# Patient Record
Sex: MALE | Race: OTHER | HISPANIC OR LATINO | URBAN - METROPOLITAN AREA
[De-identification: names, ages, dates, MRNs, and addresses within clinical notes are randomized per-mention and may not be internally consistent; named-entity substitution may affect disease eponyms.]

---

## 2020-10-09 ENCOUNTER — WEB ENCOUNTER (OUTPATIENT)
Dept: URBAN - METROPOLITAN AREA CLINIC 35 | Facility: CLINIC | Age: 20
End: 2020-10-09

## 2020-10-09 ENCOUNTER — OFFICE VISIT (OUTPATIENT)
Dept: URBAN - METROPOLITAN AREA CLINIC 33 | Facility: CLINIC | Age: 20
End: 2020-10-09

## 2020-10-09 VITALS
BODY MASS INDEX: 23.84 KG/M2 | WEIGHT: 176 LBS | HEIGHT: 72 IN | DIASTOLIC BLOOD PRESSURE: 60 MMHG | SYSTOLIC BLOOD PRESSURE: 110 MMHG

## 2020-10-09 RX ORDER — PANTOPRAZOLE SODIUM 40 MG/1
1 TABLET TABLET, DELAYED RELEASE ORAL ONCE A DAY
Qty: 30 | Status: ACTIVE | COMMUNITY

## 2020-10-09 NOTE — HPI-MIGRATED HPI
;     Acid Reflux : Patient admits the continuous symptoms of GERD. Onset was (02/2020). The symptoms include/are described as burning sensation and is located epigastric region. Patient currently admits that he is not having any chest pain or any associated GERD symptoms other than sore throat. Patient admits that the sore throat has been ongoing for 9 months. Patient describes the sore throat pain as light burning sensation which is constant. Pain will exacerbate when drinking caffeine, eating spicy food, not getting enough sleep, and worsens in the morning.   Patient states that he is currently taking Protonix with some relief of symptoms. Patient denies indigestion, early satiety, changes in appetite, coughing, abdominal/epigastric pain, or changes in bowel habits.  Patient denies associated abdominal pains, bloating, and gas.  Patient admits getting some swab tests (strep, bronchitis, and COVID) and blood test with normal findings.   Patient denies family hx of gastric/esophageal cancer or diseases. Patient denies past EGD.   ;

## 2020-11-10 ENCOUNTER — OFFICE VISIT (OUTPATIENT)
Dept: URBAN - METROPOLITAN AREA SURGERY CENTER 8 | Facility: SURGERY CENTER | Age: 20
End: 2020-11-10

## 2020-11-17 ENCOUNTER — TELEPHONE ENCOUNTER (OUTPATIENT)
Dept: URBAN - METROPOLITAN AREA CLINIC 35 | Facility: CLINIC | Age: 20
End: 2020-11-17

## 2020-11-17 RX ORDER — CLARITHROMYCIN 500 MG/1
1 TABLET TABLET, FILM COATED ORAL TWICE A DAY
Qty: 28 TABLET | Refills: 0 | OUTPATIENT
Start: 2020-11-17

## 2020-11-17 RX ORDER — AMOXICILLIN 500 MG/1
2 CAPSULES CAPSULE ORAL TWICE A DAY
Qty: 56 CAPSULE | Refills: 0 | OUTPATIENT
Start: 2020-11-17

## 2020-11-17 RX ORDER — OMEPRAZOLE 40 MG/1
1 TABLET 30 MINUTES BEFORE MEAL CAPSULE, DELAYED RELEASE ORAL TWICE A DAY
Qty: 28 | Refills: 0 | OUTPATIENT
Start: 2020-11-17

## 2020-11-17 RX ORDER — LANSOPRAZOLE, AMOXICILLIN AND CLARITHROMYCIN 30-500-500
AS DIRECTED KIT ORAL AS DIRECTED
Qty: 1 KIT | Refills: 0 | OUTPATIENT
Start: 2020-11-17

## 2020-11-23 ENCOUNTER — OFFICE VISIT (OUTPATIENT)
Dept: URBAN - METROPOLITAN AREA CLINIC 33 | Facility: CLINIC | Age: 20
End: 2020-11-23

## 2020-11-23 NOTE — HPI-MIGRATED HPI
;   ;     Follow up- EGD : Patient presents today for follow up to his/her EGD which was completed on (11/10/2020) by Dr. Brett Francois.  Patient admits/denies any complications after his/her procedure. Since the procedure patient denies dysphagia, heartburn, globus, changes in appetite, and changes in bowel habits.   EGD report shows: 1. Localized mild mucosal changes characterized by erythema were found in the gastric antrum and in the gastric body. POSITIVE for H.pylori. 2. Localized mild mucosal changes characterized by erythema were found in the lower third of the esophagus.  ;   Acid Reflux :     Last visit (10/09/2020)  Patient admits the continuous symptoms of GERD. Onset was (02/2020). The symptoms include/are described as burning sensation and is located epigastric region. Patient currently admits that he is not having any chest pain or any associated GERD symptoms other than sore throat. Patient admits that the sore throat has been ongoing for 9 months. Patient describes the sore throat pain as light burning sensation which is constant. Pain will exacerbate when drinking caffeine, eating spicy food, not getting enough sleep, and worsens in the morning.   Patient states that he is currently taking Protonix with some relief of symptoms. Patient denies indigestion, early satiety, changes in appetite, coughing, abdominal/epigastric pain, or changes in bowel habits.  Patient denies associated abdominal pains, bloating, and gas.  Patient admits getting some swab tests (strep, bronchitis, and COVID) and blood test with normal findings.   Patient denies family hx of gastric/esophageal cancer or diseases. Patient denies past EGD.   ;

## 2020-12-04 ENCOUNTER — TELEPHONE ENCOUNTER (OUTPATIENT)
Dept: URBAN - METROPOLITAN AREA CLINIC 35 | Facility: CLINIC | Age: 20
End: 2020-12-04

## 2020-12-04 ENCOUNTER — OFFICE VISIT (OUTPATIENT)
Dept: URBAN - METROPOLITAN AREA CLINIC 33 | Facility: CLINIC | Age: 20
End: 2020-12-04

## 2020-12-04 VITALS
HEIGHT: 72 IN | WEIGHT: 173 LBS | SYSTOLIC BLOOD PRESSURE: 120 MMHG | DIASTOLIC BLOOD PRESSURE: 70 MMHG | BODY MASS INDEX: 23.43 KG/M2

## 2020-12-04 PROBLEM — 196731005 GASTRODUODENITIS: Status: ACTIVE | Noted: 2020-12-04

## 2020-12-04 RX ORDER — LANSOPRAZOLE, AMOXICILLIN AND CLARITHROMYCIN 30-500-500
AS DIRECTED KIT ORAL AS DIRECTED
Qty: 1 KIT | Refills: 0 | Status: ON HOLD | COMMUNITY
Start: 2020-11-17

## 2020-12-04 RX ORDER — BISMUTH SUBCITRATE POTASSIUM, METRONIDAZOLE, TETRACYCLINE HYDROCHLORIDE 140; 125; 125 MG/1; MG/1; MG/1
3 CAPSULES AFTER MEALS AND AT BEDTIME CAPSULE ORAL
Qty: 120 CAPSULE | Refills: 0 | OUTPATIENT
Start: 2020-12-04

## 2020-12-04 RX ORDER — CLARITHROMYCIN 500 MG/1
1 TABLET TABLET, FILM COATED ORAL TWICE A DAY
Qty: 28 TABLET | Refills: 0 | Status: ON HOLD | COMMUNITY
Start: 2020-11-17

## 2020-12-04 RX ORDER — AMOXICILLIN 500 MG/1
2 CAPSULES CAPSULE ORAL TWICE A DAY
Qty: 56 CAPSULE | Refills: 0 | Status: ON HOLD | COMMUNITY
Start: 2020-11-17

## 2020-12-04 RX ORDER — OMEPRAZOLE 20 MG/1
1 CAPSULE CAPSULE, DELAYED RELEASE ORAL BID
Qty: 20 CAPSULE | Refills: 0 | OUTPATIENT
Start: 2020-12-04

## 2020-12-04 RX ORDER — OMEPRAZOLE 40 MG/1
1 TABLET 30 MINUTES BEFORE MEAL CAPSULE, DELAYED RELEASE ORAL TWICE A DAY
Qty: 28 | Refills: 0 | Status: ON HOLD | COMMUNITY
Start: 2020-11-17

## 2020-12-04 RX ORDER — PANTOPRAZOLE SODIUM 40 MG/1
1 TABLET TABLET, DELAYED RELEASE ORAL ONCE A DAY
Qty: 30 | Status: ACTIVE | COMMUNITY

## 2020-12-04 RX ORDER — FAMOTIDINE 40 MG/1
1 TABLET AT BEDTIME TABLET, FILM COATED ORAL ONCE A DAY
Qty: 30 | Refills: 6 | OUTPATIENT
Start: 2020-12-04

## 2020-12-04 NOTE — HPI-MIGRATED HPI
;   ;     Follow up- EGD : Patient presents today for follow up to his EGD which was completed on (11/10/2020) by Dr. Brett Francois.  Patient denies any complications after his procedure. Since the procedure patient denies dysphagia, heartburn, globus, changes in appetite, and changes in bowel habits.  Patient currently admits sore throat. Patient admits that he had darker stools than usual. Patient admits abdominal pain located LUQ and RLQ.  Patient notes that in the first week of his H. pylori treatement, he was feeling better but for the last week, his symptoms have reoccured.  EGD report shows: 1. Localized mild mucosal changes characterized by erythema were found in the gastric antrum and in the gastric body. POSITIVE for H.pylori. 2. Localized mild mucosal changes characterized by erythema were found in the lower third of the esophagus.  ;   Acid Reflux : Patient currently admits sore throat. Patient admits that he had darker stools than usual. Patient admits abdominal pain located LUQ and RLQ.   Last visit (10/09/2020)  Patient admits the continuous symptoms of GERD. Onset was (02/2020). The symptoms include/are described as burning sensation and is located epigastric region. Patient currently admits that he is not having any chest pain or any associated GERD symptoms other than sore throat. Patient admits that the sore throat has been ongoing for 9 months. Patient describes the sore throat pain as light burning sensation which is constant. Pain will exacerbate when drinking caffeine, eating spicy food, not getting enough sleep, and worsens in the morning.   Patient states that he is currently taking Protonix with some relief of symptoms. Patient denies indigestion, early satiety, changes in appetite, coughing, abdominal/epigastric pain, or changes in bowel habits.  Patient denies associated abdominal pains, bloating, and gas.  Patient admits getting some swab tests (strep, bronchitis, and COVID) and blood test with normal findings.   Patient denies family hx of gastric/esophageal cancer or diseases. Patient denies past EGD.   ;

## 2021-08-13 ENCOUNTER — OFFICE VISIT (OUTPATIENT)
Dept: URBAN - METROPOLITAN AREA CLINIC 33 | Facility: CLINIC | Age: 21
End: 2021-08-13

## 2021-08-13 VITALS
HEIGHT: 72 IN | WEIGHT: 173 LBS | BODY MASS INDEX: 23.43 KG/M2 | SYSTOLIC BLOOD PRESSURE: 110 MMHG | OXYGEN SATURATION: 98 % | DIASTOLIC BLOOD PRESSURE: 70 MMHG | HEART RATE: 65 BPM

## 2021-08-13 RX ORDER — LANSOPRAZOLE, AMOXICILLIN AND CLARITHROMYCIN 30-500-500
AS DIRECTED KIT ORAL AS DIRECTED
Qty: 1 KIT | Refills: 0 | Status: ON HOLD | COMMUNITY
Start: 2020-11-17

## 2021-08-13 RX ORDER — OMEPRAZOLE 20 MG/1
1 CAPSULE CAPSULE, DELAYED RELEASE ORAL BID
Qty: 20 CAPSULE | Refills: 0 | Status: ON HOLD | COMMUNITY
Start: 2020-12-04

## 2021-08-13 RX ORDER — OMEPRAZOLE 40 MG/1
1 TABLET 30 MINUTES BEFORE MEAL CAPSULE, DELAYED RELEASE ORAL TWICE A DAY
Qty: 28 | Refills: 0 | Status: ON HOLD | COMMUNITY
Start: 2020-11-17

## 2021-08-13 RX ORDER — CLARITHROMYCIN 500 MG/1
1 TABLET TABLET, FILM COATED ORAL TWICE A DAY
Qty: 28 TABLET | Refills: 0 | Status: ON HOLD | COMMUNITY
Start: 2020-11-17

## 2021-08-13 RX ORDER — GABAPENTIN 300 MG/1
1 CAPSULE CAPSULE ORAL QHS
Qty: 30 | Refills: 4 | OUTPATIENT
Start: 2021-08-13

## 2021-08-13 RX ORDER — BISMUTH SUBCITRATE POTASSIUM, METRONIDAZOLE, TETRACYCLINE HYDROCHLORIDE 140; 125; 125 MG/1; MG/1; MG/1
3 CAPSULES AFTER MEALS AND AT BEDTIME CAPSULE ORAL
Qty: 120 CAPSULE | Refills: 0 | Status: ON HOLD | COMMUNITY
Start: 2020-12-04

## 2021-08-13 RX ORDER — SUCRALFATE 1 G
1 TABLET ON AN EMPTY STOMACH, 2 HOURS APART FROM OTHER MEDS TABLET ORAL TWICE A DAY
Qty: 60 | Refills: 6 | OUTPATIENT
Start: 2021-08-13

## 2021-08-13 RX ORDER — PANTOPRAZOLE SODIUM 40 MG/1
1 TABLET TABLET, DELAYED RELEASE ORAL ONCE A DAY
Qty: 30 | Status: ON HOLD | COMMUNITY

## 2021-08-13 RX ORDER — AMOXICILLIN 500 MG/1
2 CAPSULES CAPSULE ORAL TWICE A DAY
Qty: 56 CAPSULE | Refills: 0 | Status: ON HOLD | COMMUNITY
Start: 2020-11-17

## 2021-08-13 RX ORDER — FAMOTIDINE 40 MG/1
1 TABLET AT BEDTIME TABLET, FILM COATED ORAL ONCE A DAY
Qty: 30 | Refills: 6 | Status: ACTIVE | COMMUNITY
Start: 2020-12-04

## 2021-08-13 RX ORDER — OMEPRAZOLE 40 MG/1
1 CAPSULE CAPSULE, DELAYED RELEASE ORAL
Qty: 30 | Refills: 6 | OUTPATIENT
Start: 2021-08-13

## 2021-09-22 ENCOUNTER — WEB ENCOUNTER (OUTPATIENT)
Dept: URBAN - METROPOLITAN AREA CLINIC 33 | Facility: CLINIC | Age: 21
End: 2021-09-22

## 2021-09-22 ENCOUNTER — WEB ENCOUNTER (OUTPATIENT)
Dept: URBAN - METROPOLITAN AREA CLINIC 31 | Facility: CLINIC | Age: 21
End: 2021-09-22

## 2021-09-24 ENCOUNTER — OFFICE VISIT (OUTPATIENT)
Dept: URBAN - METROPOLITAN AREA CLINIC 33 | Facility: CLINIC | Age: 21
End: 2021-09-24

## 2021-09-24 NOTE — HPI-MIGRATED HPI
;   ;   ;   ;     Follow up- EGD :                   Last Visit: 08/13/2021  Patient presents today for follow up to his EGD which was completed on (11/10/2020) by Dr. Brett Francois.  Patient denies any complications after his procedure. Since the procedure patient denies dysphagia, heartburn, globus, changes in appetite, and changes in bowel habits.  Patient currently admits sore throat. Patient admits that he had darker stools than usual. Patient admits abdominal pain located LUQ and RLQ.  Patient notes that in the first week of his H. pylori treatement, he was feeling better but for the last week, his symptoms have reoccured.  EGD report shows: 1. Localized mild mucosal changes characterized by erythema were found in the gastric antrum and in the gastric body. POSITIVE for H.pylori. 2. Localized mild mucosal changes characterized by erythema were found in the lower third of the esophagus.;   Dyspepsia : 22 y/o male patient presents today with ongoing ?? symptoms of dyspepsia. The symptoms include/are described as Upper abdominal discomfort, described as burning sensation. Patient denies/admits  bloating,  gassiness, nausea, vomiting  belching, heartburn, regurgitation, or early satiety  Patient states that he has tried omeprazole and carafate ??? with/without relief of symptoms. Patient denies ??? coughing, abdominal/epigastric pain, or changes in bowel habits.  Patient states that they have had any recent changes in their medications.  Patient denies family hx of gastric/esophageal cancer or diseases. Patient denies/admits past EGD which was performed in --- by  ---- with  -- findings.  Patient denies/admits gastric emptying study performed on (?) consistent with gastroparesis.  ;   Acid Reflux : Patient presents today with 6 weeks follow up to his acid reflux symptoms. Patient admits to continuous ?? symptoms of acid reflux. Patient notes that he was doing fairly well until middle of July, when his chest pain, epigastric pain, and sore throat symptoms reappeared.  Patient admits ??? indigestion, early satiety, changes in appetite, coughing, bloating, and gas.  Patient notes that the pain exacerbates ??? when drinking caffeine, eating spicy food, not getting enough sleep, and worsens in the morning.  Patient states that he is taking ??? Omeprazole 20 mg and Carafata 1 GM with/without relief of symptoms. Patient has been continuously taking ??? Famotidine 40 mg and Gas-x with relief of pain and symptoms.  Patient currently admits ??? sore throat. Patient admits ??? that he had darker stools than usual.               Last Visit: 08/13/2021  Patient admits the continuous symptoms of acid reflux. Patient notes that he was doing fairly well until middle of July, when his chest pain, epigastric pain, and sore throat symptoms reappeared.  Patient admits indigestion, early satiety, changes in appetite, coughing, bloating, and gas.  Patient notes that the pain exacerbates when drinking caffeine, eating spicy food, not getting enough sleep, and worsens in the morning.  Patient states that he is  not taking Omeprazole 20 mg, Protonix 40 mg, and Carafata 1 GM any longer.  Patient has been continuously taking Famotidine 40 mg and Gas-x with relief of pain and symptoms.  Patient admits 1 formed bowel movement per day without mucus, melena, or blood in stools.      Last visit (12/04/2021) Patient currently admits sore throat. Patient admits that he had darker stools than usual. Patient admits abdominal pain located LUQ and RLQ.   Last visit (10/09/2020)  Patient admits the continuous symptoms of GERD. Onset was (02/2020). The symptoms include/are described as burning sensation and is located epigastric region. Patient currently admits that he is not having any chest pain or any associated GERD symptoms other than sore throat. Patient admits that the sore throat has been ongoing for 9 months. Patient describes the sore throat pain as light burning sensation which is constant. Pain will exacerbate when drinking caffeine, eating spicy food, not getting enough sleep, and worsens in the morning.   Patient states that he is currently taking Protonix with some relief of symptoms. Patient denies indigestion, early satiety, changes in appetite, coughing, abdominal/epigastric pain, or changes in bowel habits.  Patient denies associated abdominal pains, bloating, and gas.  Patient admits getting some swab tests (strep, bronchitis, and COVID) and blood test with normal findings.   Patient denies family hx of gastric/esophageal cancer or diseases. Patient denies past EGD.;   Epigastric Pain : --  y/o female patient presents today with contiuned ?? epigastric pain which is located on the located LUQ and RLQ. The onset was ---  ago.  The course / duration of symptoms is persistent/intermittent. The course/duration of symptoms is constant and fluctuating in intensification. The degree of symptoms is moderate to severe. The exacerbating factor is eating. The relieving factor is none.  Patient admits to ??? watery/semi- formed/formed bowel movements per day, with no mucus, melena, or blood in stool.  Patient denies bloating/gas, indigestion, or changes in bowel habits. Patient denies associated epigastric pain, nausea, vomiting, fever, chills, dizziness,  dysphagia, globus, sour eructations,  early satiety, changes in appetite, coughing.  Patient admits ??? he has been taking Gabapentin 300mg with/without relief of symptoms.  ;

## 2021-10-11 NOTE — HPI-MIGRATED HPI
;   ;     Follow up- EGD :      Last visit (12/04/2021) Patient presents today for follow up to his EGD which was completed on (11/10/2020) by Dr. Brett Francois.  Patient denies any complications after his procedure. Since the procedure patient denies dysphagia, heartburn, globus, changes in appetite, and changes in bowel habits.  Patient currently admits sore throat. Patient admits that he had darker stools than usual. Patient admits abdominal pain located LUQ and RLQ.  Patient notes that in the first week of his H. pylori treatement, he was feeling better but for the last week, his symptoms have reoccured.  EGD report shows: 1. Localized mild mucosal changes characterized by erythema were found in the gastric antrum and in the gastric body. POSITIVE for H.pylori. 2. Localized mild mucosal changes characterized by erythema were found in the lower third of the esophagus.;   Acid Reflux : Patient admits the continuous symptoms of acid reflux. Patient notes that he was doing fairly well until middle of July, when his chest pain, epigastric pain, and sore throat symptoms reappeared.  Patient admits indigestion, early satiety, changes in appetite, coughing, bloating, and gas.  Patient notes that the pain exacerbates when drinking caffeine, eating spicy food, not getting enough sleep, and worsens in the morning.  Patient states that he is  not taking Omeprazole 20 mg, Protonix 40 mg, and Carafata 1 GM any longer.  Patient has been continuously taking Famotidine 40 mg and Gas-x with relief of pain and symptoms.  Patient admits 1 formed bowel movement per day without mucus, melena, or blood in stools.      Last visit (12/04/2021) Patient currently admits sore throat. Patient admits that he had darker stools than usual. Patient admits abdominal pain located LUQ and RLQ.   Last visit (10/09/2020)  Patient admits the continuous symptoms of GERD. Onset was (02/2020). The symptoms include/are described as burning sensation and is located epigastric region. Patient currently admits that he is not having any chest pain or any associated GERD symptoms other than sore throat. Patient admits that the sore throat has been ongoing for 9 months. Patient describes the sore throat pain as light burning sensation which is constant. Pain will exacerbate when drinking caffeine, eating spicy food, not getting enough sleep, and worsens in the morning.   Patient states that he is currently taking Protonix with some relief of symptoms. Patient denies indigestion, early satiety, changes in appetite, coughing, abdominal/epigastric pain, or changes in bowel habits.  Patient denies associated abdominal pains, bloating, and gas.  Patient admits getting some swab tests (strep, bronchitis, and COVID) and blood test with normal findings.   Patient denies family hx of gastric/esophageal cancer or diseases. Patient denies past EGD.;    Alert-The patient is alert, awake and responds to voice. The patient is oriented to time, place, and person. The triage nurse is able to obtain subjective information.

## 2021-10-13 ENCOUNTER — OFFICE VISIT (OUTPATIENT)
Dept: URBAN - METROPOLITAN AREA CLINIC 31 | Facility: CLINIC | Age: 21
End: 2021-10-13

## 2021-10-13 VITALS
HEART RATE: 67 BPM | WEIGHT: 173 LBS | HEIGHT: 72 IN | BODY MASS INDEX: 23.43 KG/M2 | DIASTOLIC BLOOD PRESSURE: 82 MMHG | SYSTOLIC BLOOD PRESSURE: 128 MMHG | OXYGEN SATURATION: 98 %

## 2021-10-13 RX ORDER — BISMUTH SUBCITRATE POTASSIUM, METRONIDAZOLE, TETRACYCLINE HYDROCHLORIDE 140; 125; 125 MG/1; MG/1; MG/1
3 CAPSULES AFTER MEALS AND AT BEDTIME CAPSULE ORAL
Qty: 120 CAPSULE | Refills: 0 | Status: ON HOLD | COMMUNITY
Start: 2020-12-04

## 2021-10-13 RX ORDER — OMEPRAZOLE 20 MG/1
1 CAPSULE CAPSULE, DELAYED RELEASE ORAL BID
Qty: 20 CAPSULE | Refills: 0 | Status: ON HOLD | COMMUNITY
Start: 2020-12-04

## 2021-10-13 RX ORDER — PANTOPRAZOLE SODIUM 40 MG/1
1 TABLET TABLET, DELAYED RELEASE ORAL ONCE A DAY
Qty: 30 | Status: ON HOLD | COMMUNITY

## 2021-10-13 RX ORDER — OMEPRAZOLE 40 MG/1
1 TABLET 30 MINUTES BEFORE MEAL CAPSULE, DELAYED RELEASE ORAL TWICE A DAY
Qty: 28 | Refills: 0 | Status: ON HOLD | COMMUNITY
Start: 2020-11-17

## 2021-10-13 RX ORDER — SUCRALFATE 1 G
1 TABLET ON AN EMPTY STOMACH, 2 HOURS APART FROM OTHER MEDS TABLET ORAL TWICE A DAY
Qty: 60 | Refills: 6 | Status: ON HOLD | COMMUNITY
Start: 2021-08-13

## 2021-10-13 RX ORDER — GABAPENTIN 300 MG/1
1 CAPSULE CAPSULE ORAL QHS
Qty: 30 | Refills: 4 | Status: ACTIVE | COMMUNITY
Start: 2021-08-13

## 2021-10-13 RX ORDER — FAMOTIDINE 40 MG/1
1 TABLET AT BEDTIME TABLET, FILM COATED ORAL ONCE A DAY
Qty: 30 | Refills: 6 | Status: ON HOLD | COMMUNITY
Start: 2020-12-04

## 2021-10-13 RX ORDER — CLARITHROMYCIN 500 MG/1
1 TABLET TABLET, FILM COATED ORAL TWICE A DAY
Qty: 28 TABLET | Refills: 0 | Status: ON HOLD | COMMUNITY
Start: 2020-11-17

## 2021-10-13 RX ORDER — AMOXICILLIN 500 MG/1
2 CAPSULES CAPSULE ORAL TWICE A DAY
Qty: 56 CAPSULE | Refills: 0 | Status: ON HOLD | COMMUNITY
Start: 2020-11-17

## 2021-10-13 RX ORDER — OMEPRAZOLE 40 MG/1
1 CAPSULE CAPSULE, DELAYED RELEASE ORAL
Qty: 30 | Refills: 6 | Status: ON HOLD | COMMUNITY
Start: 2021-08-13

## 2021-10-13 RX ORDER — LANSOPRAZOLE, AMOXICILLIN AND CLARITHROMYCIN 30-500-500
AS DIRECTED KIT ORAL AS DIRECTED
Qty: 1 KIT | Refills: 0 | Status: ON HOLD | COMMUNITY
Start: 2020-11-17

## 2021-10-13 NOTE — HPI-MIGRATED HPI
;   ;   ;     Follow up- EGD : Last Visit: 08/13/2021  Patient presents today for follow up to his EGD which was completed on (11/10/2020) by Dr. Brett Francois.  Patient denies any complications after his procedure. Since the procedure patient denies dysphagia, heartburn, globus, changes in appetite, and changes in bowel habits.  Patient currently admits sore throat. Patient admits that he had darker stools than usual. Patient admits abdominal pain located LUQ and RLQ.  Patient notes that in the first week of his H. pylori treatement, he was feeling better but for the last week, his symptoms have reoccured.  EGD report shows: 1. Localized mild mucosal changes characterized by erythema were found in the gastric antrum and in the gastric body. POSITIVE for H.pylori. 2. Localized mild mucosal changes characterized by erythema were found in the lower third of the esophagus.;   Dyspepsia : 22 y/o male patient states that symptoms of dyspepsia have resolved. Patient no longer takes any medications for relief.   ;   Acid Reflux : 22 y/o male patient presents today with 6 weeks follow up to his acid reflux symptoms. Patient admits to continuous, but decreased symptoms of acid reflux. Patient notes that his symptoms of chest pain, epigastric pain, and sore throat symptoms have resolved.   Patient denies indigestion, early satiety, changes in appetite, coughing, bloating, or gas.  Patient notes that the pain exacerbates when drinking caffeine, eating spicy food, or not getting enough sleep, and it worsens in the morning. Patient states that he has discontinued taking Omeprazole 20 mg, Carafata 1 GM, and Famotidine 40 MG. Currently, he states that he is only taking Gabapentin 300mg for relief of his symptoms.   Patient currently admits to 1 formed bowel movements per day.               Last Visit: 08/13/2021  Patient admits the continuous symptoms of acid reflux. Patient notes that he was doing fairly well until middle of July, when his chest pain, epigastric pain, and sore throat symptoms reappeared.  Patient admits indigestion, early satiety, changes in appetite, coughing, bloating, and gas.  Patient notes that the pain exacerbates when drinking caffeine, eating spicy food, not getting enough sleep, and worsens in the morning.  Patient states that he is  not taking Omeprazole 20 mg, Protonix 40 mg, and Carafata 1 GM any longer.  Patient has been continuously taking Famotidine 40 mg and Gas-x with relief of pain and symptoms.  Patient admits 1 formed bowel movement per day without mucus, melena, or blood in stools.      Last visit (12/04/2021) Patient currently admits sore throat. Patient admits that he had darker stools than usual. Patient admits abdominal pain located LUQ and RLQ.   Last visit (10/09/2020)  Patient admits the continuous symptoms of GERD. Onset was (02/2020). The symptoms include/are described as burning sensation and is located epigastric region. Patient currently admits that he is not having any chest pain or any associated GERD symptoms other than sore throat. Patient admits that the sore throat has been ongoing for 9 months. Patient describes the sore throat pain as light burning sensation which is constant. Pain will exacerbate when drinking caffeine, eating spicy food, not getting enough sleep, and worsens in the morning.   Patient states that he is currently taking Protonix with some relief of symptoms. Patient denies indigestion, early satiety, changes in appetite, coughing, abdominal/epigastric pain, or changes in bowel habits.  Patient denies associated abdominal pains, bloating, and gas.  Patient admits getting some swab tests (strep, bronchitis, and COVID) and blood test with normal findings.   Patient denies family hx of gastric/esophageal cancer or diseases. Patient denies past EGD.;

## 2022-04-07 ENCOUNTER — LAB OUTSIDE AN ENCOUNTER (OUTPATIENT)
Dept: URBAN - METROPOLITAN AREA CLINIC 35 | Facility: CLINIC | Age: 22
End: 2022-04-07

## 2022-04-07 ENCOUNTER — OFFICE VISIT (OUTPATIENT)
Dept: URBAN - METROPOLITAN AREA CLINIC 35 | Facility: CLINIC | Age: 22
End: 2022-04-07
Payer: SELF-PAY

## 2022-04-07 ENCOUNTER — DASHBOARD ENCOUNTERS (OUTPATIENT)
Age: 22
End: 2022-04-07

## 2022-04-07 ENCOUNTER — TELEPHONE ENCOUNTER (OUTPATIENT)
Dept: URBAN - METROPOLITAN AREA CLINIC 35 | Facility: CLINIC | Age: 22
End: 2022-04-07

## 2022-04-07 VITALS
WEIGHT: 180 LBS | HEART RATE: 70 BPM | DIASTOLIC BLOOD PRESSURE: 70 MMHG | BODY MASS INDEX: 24.38 KG/M2 | OXYGEN SATURATION: 98 % | SYSTOLIC BLOOD PRESSURE: 122 MMHG | HEIGHT: 72 IN

## 2022-04-07 DIAGNOSIS — K21.9 GERD (GASTROESOPHAGEAL REFLUX DISEASE): ICD-10-CM

## 2022-04-07 DIAGNOSIS — R07.0 THROAT PAIN: ICD-10-CM

## 2022-04-07 DIAGNOSIS — R10.13 EPIGASTRIC ABDOMINAL PAIN: ICD-10-CM

## 2022-04-07 PROBLEM — 235595009 GASTROESOPHAGEAL REFLUX DISEASE: Status: ACTIVE | Noted: 2021-08-13

## 2022-04-07 PROCEDURE — 99214 OFFICE O/P EST MOD 30 MIN: CPT | Performed by: PHYSICIAN ASSISTANT

## 2022-04-07 RX ORDER — AMOXICILLIN 500 MG/1
2 CAPSULES CAPSULE ORAL TWICE A DAY
Qty: 56 CAPSULE | Refills: 0 | Status: ON HOLD | COMMUNITY
Start: 2020-11-17

## 2022-04-07 RX ORDER — HYOSCYAMINE SULFATE 0.12 MG/1
1 TABLET AS NEEDED TABLET ORAL
Qty: 30 | Refills: 1 | OUTPATIENT
Start: 2022-04-07 | End: 2022-06-06

## 2022-04-07 RX ORDER — PANTOPRAZOLE SODIUM 40 MG/1
1 TABLET TABLET, DELAYED RELEASE ORAL ONCE A DAY
Qty: 30 | Status: ON HOLD | COMMUNITY

## 2022-04-07 RX ORDER — FAMOTIDINE 40 MG/1
1 TABLET AT BEDTIME TABLET, FILM COATED ORAL ONCE A DAY
Qty: 30 | Refills: 6 | Status: ON HOLD | COMMUNITY
Start: 2020-12-04

## 2022-04-07 RX ORDER — OMEPRAZOLE 40 MG/1
1 TABLET 30 MINUTES BEFORE MEAL CAPSULE, DELAYED RELEASE ORAL TWICE A DAY
Qty: 28 | Refills: 0 | Status: ON HOLD | COMMUNITY
Start: 2020-11-17

## 2022-04-07 RX ORDER — GABAPENTIN 300 MG/1
1 CAPSULE CAPSULE ORAL QHS
Qty: 30 | Refills: 4 | COMMUNITY
Start: 2021-08-13

## 2022-04-07 RX ORDER — OMEPRAZOLE 20 MG/1
1 CAPSULE CAPSULE, DELAYED RELEASE ORAL BID
Qty: 20 CAPSULE | Refills: 0 | Status: ACTIVE | COMMUNITY
Start: 2020-12-04

## 2022-04-07 RX ORDER — LANSOPRAZOLE, AMOXICILLIN AND CLARITHROMYCIN 30-500-500
AS DIRECTED KIT ORAL AS DIRECTED
Qty: 1 KIT | Refills: 0 | Status: ON HOLD | COMMUNITY
Start: 2020-11-17

## 2022-04-07 RX ORDER — BISMUTH SUBCITRATE POTASSIUM, METRONIDAZOLE, TETRACYCLINE HYDROCHLORIDE 140; 125; 125 MG/1; MG/1; MG/1
3 CAPSULES AFTER MEALS AND AT BEDTIME CAPSULE ORAL
Qty: 120 CAPSULE | Refills: 0 | Status: ON HOLD | COMMUNITY
Start: 2020-12-04

## 2022-04-07 RX ORDER — OMEPRAZOLE 40 MG/1
1 CAPSULE 30 MINUTES BEFORE MORNING MEAL CAPSULE, DELAYED RELEASE ORAL ONCE A DAY
Qty: 30 | Refills: 2 | OUTPATIENT
Start: 2022-04-07

## 2022-04-07 RX ORDER — SUCRALFATE 1 G
1 TABLET ON AN EMPTY STOMACH, 2 HOURS APART FROM OTHER MEDS TABLET ORAL TWICE A DAY
Qty: 60 | Refills: 6 | Status: ON HOLD | COMMUNITY
Start: 2021-08-13

## 2022-04-07 RX ORDER — CLARITHROMYCIN 500 MG/1
1 TABLET TABLET, FILM COATED ORAL TWICE A DAY
Qty: 28 TABLET | Refills: 0 | Status: ON HOLD | COMMUNITY
Start: 2020-11-17

## 2022-04-07 NOTE — HPI-ACID REFLUX
Patient presents for follow up of acid reflux. Patient admits he is currently still experiencing symptoms . Patient descibes current symptoms as a buring sensation in throat and also associated epigastric pains . Patient states when he wakes in the morning the throat discomfort is present then throughout the day the abdominal pain progresses. He recently started takig Omeprazole 20mg daily 1 week ago. Patient admits slight improvement with medication. He denies nausea or vomiting.       Of last visit (10/13/2021) Acid Reflux : 22 y/o male patient presents today with 6 weeks follow up to his acid reflux symptoms. Patient admits to continuous, but decreased symptoms of acid reflux. Patient notes that his symptoms of chest pain, epigastric pain, and sore throat symptoms have resolved.   Patient denies indigestion, early satiety, changes in appetite, coughing, bloating, or gas.  Patient notes that the pain exacerbates when drinking caffeine, eating spicy food, or not getting enough sleep, and it worsens in the morning. Patient states that he has discontinued taking Omeprazole 20 mg, Carafata 1 GM, and Famotidine 40 MG. Currently, he states that he is only taking Gabapentin 300mg for relief of his symptoms.   Patient currently admits to 1 formed bowel movements per day.               Last Visit: 08/13/2021  Patient admits the continuous symptoms of acid reflux. Patient notes that he was doing fairly well until middle of July, when his chest pain, epigastric pain, and sore throat symptoms reappeared.  Patient admits indigestion, early satiety, changes in appetite, coughing, bloating, and gas.  Patient notes that the pain exacerbates when drinking caffeine, eating spicy food, not getting enough sleep, and worsens in the morning.  Patient states that he is  not taking Omeprazole 20 mg, Protonix 40 mg, and Carafata 1 GM any longer.  Patient has been continuously taking Famotidine 40 mg and Gas-x with relief of pain and symptoms.  Patient admits 1 formed bowel movement per day without mucus, melena, or blood in stools.      Last visit (12/04/2021) Patient currently admits sore throat. Patient admits that he had darker stools than usual. Patient admits abdominal pain located LUQ and RLQ.   Last visit (10/09/2020)  Patient admits the continuous symptoms of GERD. Onset was (02/2020). The symptoms include/are described as burning sensation and is located epigastric region. Patient currently admits that he is not having any chest pain or any associated GERD symptoms other than sore throat. Patient admits that the sore throat has been ongoing for 9 months. Patient describes the sore throat pain as light burning sensation which is constant. Pain will exacerbate when drinking caffeine, eating spicy food, not getting enough sleep, and worsens in the morning.   Patient states that he is currently taking Protonix with some relief of symptoms. Patient denies indigestion, early satiety, changes in appetite, coughing, abdominal/epigastric pain, or changes in bowel habits.  Patient denies associated abdominal pains, bloating, and gas.  Patient admits getting some swab tests (strep, bronchitis, and COVID) and blood test with normal findings.   Patient denies family hx of gastric/esophageal cancer or diseases. Patient denies past EGD.;

## 2022-04-07 NOTE — PHYSICAL EXAM GASTROINTESTINAL
Abdomen , soft, tender epigastrum and RUQ, nondistended , no guarding or rigidity , no masses palpable , normal bowel sounds , Liver and Spleen , no hepatomegaly present , no hepatosplenomegaly , liver nontender , spleen not palpable

## 2022-04-07 NOTE — HPI-ABDOMINAL PAIN
Patient admits to abd pain epigastrid after eating moreso.  He has been avoiding greasy foods and caffeine.  He is on Omeprazole 20mg daily for past week.  He thinks greasy foods will increase pains.  He denies any alleviating factors other than not eating.  He states pain is sharp and throughout the day. He states pain has been present for two years, but progressively worsening.  He states he had bloodwork drawn 6 months ago or so.  He denies any N/V.

## 2022-04-29 ENCOUNTER — OFFICE VISIT (OUTPATIENT)
Dept: URBAN - METROPOLITAN AREA CLINIC 33 | Facility: CLINIC | Age: 22
End: 2022-04-29